# Patient Record
Sex: FEMALE | Race: WHITE
[De-identification: names, ages, dates, MRNs, and addresses within clinical notes are randomized per-mention and may not be internally consistent; named-entity substitution may affect disease eponyms.]

---

## 2019-05-31 ENCOUNTER — HOSPITAL ENCOUNTER (EMERGENCY)
Dept: HOSPITAL 12 - ER | Age: 39
LOS: 1 days | Discharge: HOME | End: 2019-06-01
Payer: COMMERCIAL

## 2019-05-31 VITALS — HEIGHT: 68 IN | BODY MASS INDEX: 21.07 KG/M2 | WEIGHT: 139 LBS

## 2019-05-31 DIAGNOSIS — Z88.0: ICD-10-CM
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
## 2019-05-31 DIAGNOSIS — Z79.899: ICD-10-CM
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
## 2019-05-31 DIAGNOSIS — S01.81XA: Primary | ICD-10-CM
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
## 2019-05-31 DIAGNOSIS — S60.211A: ICD-10-CM

## 2019-05-31 DIAGNOSIS — S60.212A: ICD-10-CM

## 2019-05-31 PROCEDURE — A4217 STERILE WATER/SALINE, 500 ML: HCPCS

## 2019-05-31 PROCEDURE — 12052 INTMD RPR FACE/MM 2.6-5.0 CM: CPT

## 2019-05-31 PROCEDURE — A4663 DIALYSIS BLOOD PRESSURE CUFF: HCPCS

## 2019-05-31 PROCEDURE — 96372 THER/PROPH/DIAG INJ SC/IM: CPT

## 2019-05-31 PROCEDURE — 99284 EMERGENCY DEPT VISIT MOD MDM: CPT

## 2019-06-04 ENCOUNTER — HOSPITAL ENCOUNTER (EMERGENCY)
Dept: HOSPITAL 54 - ER | Age: 39
LOS: 1 days | Discharge: HOME | End: 2019-06-05
Payer: SELF-PAY

## 2019-06-04 VITALS — HEIGHT: 68 IN | WEIGHT: 145 LBS | BODY MASS INDEX: 21.98 KG/M2
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
## 2019-06-04 DIAGNOSIS — F10.129: Primary | ICD-10-CM

## 2019-06-04 LAB
ALBUMIN SERPL BCP-MCNC: 3.6 G/DL (ref 3.4–5)
ALP SERPL-CCNC: 56 U/L (ref 46–116)
ALT SERPL W P-5'-P-CCNC: 46 U/L (ref 12–78)
APAP SERPL-MCNC: < 2 UG/ML (ref 10–30)
AST SERPL W P-5'-P-CCNC: 47 U/L (ref 15–37)
BASOPHILS # BLD AUTO: 0 /CMM (ref 0–0.2)
BASOPHILS NFR BLD AUTO: 0.7 % (ref 0–2)
BILIRUB DIRECT SERPL-MCNC: 0.1 MG/DL (ref 0–0.2)
BILIRUB SERPL-MCNC: 0.3 MG/DL (ref 0.2–1)
BUN SERPL-MCNC: 16 MG/DL (ref 7–18)
CALCIUM SERPL-MCNC: 8.5 MG/DL (ref 8.5–10.1)
CHLORIDE SERPL-SCNC: 104 MMOL/L (ref 98–107)
CO2 SERPL-SCNC: 26 MMOL/L (ref 21–32)
CREAT SERPL-MCNC: 0.6 MG/DL (ref 0.6–1.3)
EOSINOPHIL NFR BLD AUTO: 0.1 % (ref 0–6)
ETHANOL SERPL-MCNC: 412 MG/DL (ref 0–0)
GLUCOSE SERPL-MCNC: 134 MG/DL (ref 74–106)
HCT VFR BLD AUTO: 38 % (ref 33–45)
HGB BLD-MCNC: 12.7 G/DL (ref 11.5–14.8)
KETONES UR STRIP-MCNC: 15 MG/DL
LYMPHOCYTES NFR BLD AUTO: 1.4 /CMM (ref 0.8–4.8)
LYMPHOCYTES NFR BLD AUTO: 29 % (ref 20–44)
MCHC RBC AUTO-ENTMCNC: 33 G/DL (ref 31–36)
MCV RBC AUTO: 92 FL (ref 82–100)
MONOCYTES NFR BLD AUTO: 0.5 /CMM (ref 0.1–1.3)
MONOCYTES NFR BLD AUTO: 9.8 % (ref 2–12)
NEUTROPHILS # BLD AUTO: 2.8 /CMM (ref 1.8–8.9)
NEUTROPHILS NFR BLD AUTO: 60.4 % (ref 43–81)
PH UR STRIP: 5.5 [PH] (ref 5–8)
PLATELET # BLD AUTO: 298 /CMM (ref 150–450)
POTASSIUM SERPL-SCNC: 3.7 MMOL/L (ref 3.5–5.1)
PROT SERPL-MCNC: 7.1 G/DL (ref 6.4–8.2)
RBC # BLD AUTO: 4.14 MIL/UL (ref 4–5.2)
RBC #/AREA URNS HPF: (no result) /HPF (ref 0–2)
SALICYLATES SERPL-MCNC: < 2.8 MG/DL (ref 2.8–20)
SODIUM SERPL-SCNC: 141 MMOL/L (ref 136–145)
UROBILINOGEN UR STRIP-MCNC: 0.2 EU/DL
WBC #/AREA URNS HPF: (no result) /HPF (ref 0–3)
WBC NRBC COR # BLD AUTO: 4.7 K/UL (ref 4.3–11)

## 2019-06-04 PROCEDURE — 80307 DRUG TEST PRSMV CHEM ANLYZR: CPT

## 2019-06-04 PROCEDURE — 99283 EMERGENCY DEPT VISIT LOW MDM: CPT

## 2019-06-04 PROCEDURE — 80048 BASIC METABOLIC PNL TOTAL CA: CPT

## 2019-06-04 PROCEDURE — G0480 DRUG TEST DEF 1-7 CLASSES: HCPCS

## 2019-06-04 PROCEDURE — 80305 DRUG TEST PRSMV DIR OPT OBS: CPT

## 2019-06-04 PROCEDURE — 80076 HEPATIC FUNCTION PANEL: CPT

## 2019-06-04 PROCEDURE — 84703 CHORIONIC GONADOTROPIN ASSAY: CPT

## 2019-06-04 PROCEDURE — 81001 URINALYSIS AUTO W/SCOPE: CPT

## 2019-06-04 PROCEDURE — 85025 COMPLETE CBC W/AUTO DIFF WBC: CPT

## 2019-06-04 PROCEDURE — 36415 COLL VENOUS BLD VENIPUNCTURE: CPT

## 2019-06-04 PROCEDURE — 80329 ANALGESICS NON-OPIOID 1 OR 2: CPT
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
## 2019-06-04 NOTE — NUR
PT'S EX , CATHY, CALLED BACK AND TOLD ME THAT HE IS ESTRANGED FROM [facility] 
AND IS NOT THE PERSON TO PICK HER UP AND TAKE HER HOME. HE STATED THAT THE PT'S 
FATHER WILL BE COMING TO [place] NEXT WEEK TO TAKE HER BACK TO [place].

## 2019-06-04 NOTE — NUR
APOLLO LARA IS AT THE BEDSIDE FOR BLOOD DRAW. PT IS TIRED, BUT STATED 
THAT SHE COULD GIVE A URINE SAMPLE SOON.

## 2019-06-04 NOTE — NUR
PT WAS WORRIED THAT SHE WOULD BE KEPT IN THE HOSPITAL AS SHE HAS TO FLY TO 
[place] ON THE 12 OF JUNE.

## 2019-06-04 NOTE — NUR
CALLED PT'S EX , CATHY FRAZIER, AT [phone]. PT WOULD LIKE CATHY TO 
COME TO THE HOSPITAL AND TAKE HER HOME. CATHY WAS DRIVING AND STATED THAT HE 
WOULD CALL ME BACK. PT WAS NOTIFIED.

## 2019-06-04 NOTE — NUR
PT APPEARS TO BE SLEEPING SOUNDLY WITH NO S/S OF PAIN OR DISTRESS WILL CONTINUE 
TO MONITOR THE PT. PT REMOVED ALL MONITOR LEADS.

## 2019-06-05 VITALS — SYSTOLIC BLOOD PRESSURE: 116 MMHG | DIASTOLIC BLOOD PRESSURE: 82 MMHG

## 2019-06-05 NOTE — NUR
DR VEGA IS AT THE BEDSIDE REMOVING PT'S LT UPPER EYE/EYEBROW SUTURES. IV 
removed. Catheter intact and site benign. Pressure and 4x4 applied to site. No 
bleeding noted. Patient discharged to home in stable condition. Written and 
verbal after care instructions given. Patient verbalizes understanding of 
instruction. PT WAS TOLD TO TRY AND GET INTO AN ALCOHOL REHAB. PT IS GOING TO 
[place] NEXT WEEK. PT IS TAKING AN UBER HOME. PT IS AMBULATORY WITH A STEADY 
GAIT. VSS.

## 2019-06-06 ENCOUNTER — HOSPITAL ENCOUNTER (EMERGENCY)
Dept: HOSPITAL 54 - ER | Age: 39
LOS: 1 days | Discharge: HOME | End: 2019-06-07
Payer: COMMERCIAL

## 2019-06-06 VITALS — BODY MASS INDEX: 23.63 KG/M2 | WEIGHT: 147 LBS | HEIGHT: 66 IN

## 2019-06-06 DIAGNOSIS — G93.40: ICD-10-CM

## 2019-06-06 DIAGNOSIS — T51.8X1A: Primary | ICD-10-CM
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
## 2019-06-06 DIAGNOSIS — R41.82: ICD-10-CM

## 2019-06-06 DIAGNOSIS — F10.20: ICD-10-CM

## 2019-06-06 LAB
ALBUMIN SERPL BCP-MCNC: 3.5 G/DL (ref 3.4–5)
ALP SERPL-CCNC: 52 U/L (ref 46–116)
ALT SERPL W P-5'-P-CCNC: 85 U/L (ref 12–78)
APAP SERPL-MCNC: 0 UG/ML (ref 10–30)
AST SERPL W P-5'-P-CCNC: 129 U/L (ref 15–37)
BASOPHILS # BLD AUTO: 0 /CMM (ref 0–0.2)
BASOPHILS NFR BLD AUTO: 0.9 % (ref 0–2)
BILIRUB DIRECT SERPL-MCNC: 0.1 MG/DL (ref 0–0.2)
BILIRUB SERPL-MCNC: 0.4 MG/DL (ref 0.2–1)
BUN SERPL-MCNC: 11 MG/DL (ref 7–18)
CALCIUM SERPL-MCNC: 7.8 MG/DL (ref 8.5–10.1)
CHLORIDE SERPL-SCNC: 112 MMOL/L (ref 98–107)
CO2 SERPL-SCNC: 29 MMOL/L (ref 21–32)
CREAT SERPL-MCNC: 0.5 MG/DL (ref 0.6–1.3)
EOSINOPHIL NFR BLD AUTO: 1.5 % (ref 0–6)
ETHANOL SERPL-MCNC: 447 MG/DL (ref 0–0)
GLUCOSE SERPL-MCNC: 83 MG/DL (ref 74–106)
HCT VFR BLD AUTO: 38 % (ref 33–45)
HGB BLD-MCNC: 12.6 G/DL (ref 11.5–14.8)
LIPASE SERPL-CCNC: 156 U/L (ref 73–393)
LYMPHOCYTES NFR BLD AUTO: 2.6 /CMM (ref 0.8–4.8)
LYMPHOCYTES NFR BLD AUTO: 58.8 % (ref 20–44)
MCHC RBC AUTO-ENTMCNC: 33 G/DL (ref 31–36)
MCV RBC AUTO: 92 FL (ref 82–100)
MONOCYTES NFR BLD AUTO: 0.4 /CMM (ref 0.1–1.3)
MONOCYTES NFR BLD AUTO: 9.3 % (ref 2–12)
NEUTROPHILS # BLD AUTO: 1.3 /CMM (ref 1.8–8.9)
NEUTROPHILS NFR BLD AUTO: 29.5 % (ref 43–81)
PH UR STRIP: 6 [PH] (ref 5–8)
PLATELET # BLD AUTO: 240 /CMM (ref 150–450)
POTASSIUM SERPL-SCNC: 3.4 MMOL/L (ref 3.5–5.1)
PROT SERPL-MCNC: 6.7 G/DL (ref 6.4–8.2)
RBC # BLD AUTO: 4.1 MIL/UL (ref 4–5.2)
RBC #/AREA URNS HPF: (no result) /HPF (ref 0–2)
SALICYLATES SERPL-MCNC: 0.5 MG/DL (ref 2.8–20)
SODIUM SERPL-SCNC: 152 MMOL/L (ref 136–145)
UROBILINOGEN UR STRIP-MCNC: 0.2 EU/DL
WBC #/AREA URNS HPF: (no result) /HPF (ref 0–3)
WBC NRBC COR # BLD AUTO: 4.4 K/UL (ref 4.3–11)

## 2019-06-06 PROCEDURE — 70450 CT HEAD/BRAIN W/O DYE: CPT

## 2019-06-06 PROCEDURE — 96360 HYDRATION IV INFUSION INIT: CPT

## 2019-06-06 PROCEDURE — 96361 HYDRATE IV INFUSION ADD-ON: CPT

## 2019-06-06 PROCEDURE — 84703 CHORIONIC GONADOTROPIN ASSAY: CPT

## 2019-06-06 PROCEDURE — 80307 DRUG TEST PRSMV CHEM ANLYZR: CPT

## 2019-06-06 PROCEDURE — G0480 DRUG TEST DEF 1-7 CLASSES: HCPCS

## 2019-06-06 PROCEDURE — 85025 COMPLETE CBC W/AUTO DIFF WBC: CPT

## 2019-06-06 PROCEDURE — 71045 X-RAY EXAM CHEST 1 VIEW: CPT

## 2019-06-06 PROCEDURE — 80305 DRUG TEST PRSMV DIR OPT OBS: CPT

## 2019-06-06 PROCEDURE — 83690 ASSAY OF LIPASE: CPT

## 2019-06-06 PROCEDURE — 99284 EMERGENCY DEPT VISIT MOD MDM: CPT

## 2019-06-06 PROCEDURE — 81001 URINALYSIS AUTO W/SCOPE: CPT

## 2019-06-06 PROCEDURE — 51702 INSERT TEMP BLADDER CATH: CPT

## 2019-06-06 PROCEDURE — 80076 HEPATIC FUNCTION PANEL: CPT

## 2019-06-06 PROCEDURE — 84484 ASSAY OF TROPONIN QUANT: CPT

## 2019-06-06 PROCEDURE — 80329 ANALGESICS NON-OPIOID 1 OR 2: CPT

## 2019-06-06 PROCEDURE — 80048 BASIC METABOLIC PNL TOTAL CA: CPT

## 2019-06-06 PROCEDURE — 36415 COLL VENOUS BLD VENIPUNCTURE: CPT

## 2019-06-06 PROCEDURE — 93005 ELECTROCARDIOGRAM TRACING: CPT

## 2019-06-06 NOTE — NUR
PT AWAKE. INTOXICATED. MD NOTIFIED PT IS ALREADY AWAKE. NAD, BREATHING EVEN AND 
UNLABORED. PT ASKED TO HAVE HER  CALLED, NO ANSWER BY .

## 2019-06-06 NOTE — NUR
TANYA FROM HOME. PT IS UNRESPONSIVE. SMELL OF ALCOHOL. BRETHING SHALLOW BUT 
EVEN. C/O OVERDOSE OF UNKNOWN SUBSTANCE. RA REPORT THAT PT IS ALREADY FOUND 
UNCONSCIUOUS. NARCAN GIVEN ON SITE. PT CAME IN WITH IV LINE ON R AC 18 G W NS 
RUNNING. MD AT BEDSIDE FOR EVAL. PT PLACE ON MONITOR AND PLACE ON O2 VIA NC @ 
2LPM. EKG BEING DONE AS WELL.

## 2019-06-07 VITALS — SYSTOLIC BLOOD PRESSURE: 112 MMHG | DIASTOLIC BLOOD PRESSURE: 62 MMHG

## 2019-06-07 NOTE — NUR
Patient discharged to home in stable condition. Written and verbal after care 
instructions given. Patient verbalizes understanding of instruction.IV removed. 
Catheter intact and site benign. Pressure and 4x4 applied to site. No bleeding 
noted. Pt ambulatory with a steady gait

## 2019-06-07 NOTE — NUR
pt is awake. nad, breathing even and unlabored. pt asked for food and water. 
given sandwhich and water. tolerating po intake well.

## 2019-06-07 NOTE — NUR
PT AMBULATED WITH SBA AROUND THE UNIT. PT TOLERATED AMBULATION. PT ALSO WENT TO 
BATHROOM AND REPORTED THAT SHE VOIDED.

## 2019-06-07 NOTE — NUR
INDWELING F/C DISCONTINUED. NOTED 1250ML URINE OUTPUT SINCE INSERTION. URINE 
YELLOW AND CLEAR. MD AWARE.